# Patient Record
(demographics unavailable — no encounter records)

---

## 2025-05-27 NOTE — PHYSICAL EXAM
[Alert] : alert [Normocephalic] : normocephalic [Flat Open Anterior [place]] : flat open anterior fontanelle [PERRL] : PERRL [Red Reflex Bilateral] : red reflex bilateral [Normally Placed Ears] : normally placed ears [Auricles Well Formed] : auricles well formed [Clear Tympanic membranes] : clear tympanic membranes [Light reflex present] : light reflex present [Bony structures visible] : bony structures visible [Patent Auditory Canal] : patent auditory canal [Nares Patent] : nares patent [Palate Intact] : palate intact [Uvula Midline] : uvula midline [Supple, full passive range of motion] : supple, full passive range of motion [Symmetric Chest Rise] : symmetric chest rise [Clear to Auscultation Bilaterally] : clear to auscultation bilaterally [Regular Rate and Rhythm] : regular rate and rhythm [S1, S2 present] : S1, S2 present [+2 Femoral Pulses] : +2 femoral pulses [Soft] : soft [Bowel Sounds] : bowel sounds present [Umbilical Stump Dry, Clean, Intact] : umbilical stump dry, clean, intact [Normal external genitalia] : normal external genitalia [Patent Vagina] : patent vagina [Patent] : patent [Normally Placed] : normally placed [No Abnormal Lymph Nodes Palpated] : no abnormal lymph nodes palpated [Symmetric Flexed Extremities] : symmetric flexed extremities [Startle Reflex] : startle reflex present [Suck Reflex] : suck reflex present [Rooting] : rooting reflex present [Palmar Grasp] : palmar grasp present [Plantar Grasp] : plantar reflex present [Symmetric [place]] : symmetric [employer]

## 2025-05-27 NOTE — PHYSICAL EXAM
[Alert] : alert [Normocephalic] : normocephalic [Flat Open Anterior [place]] : flat open anterior fontanelle [PERRL] : PERRL [Red Reflex Bilateral] : red reflex bilateral [Normally Placed Ears] : normally placed ears [Auricles Well Formed] : auricles well formed [Clear Tympanic membranes] : clear tympanic membranes [Light reflex present] : light reflex present [Bony structures visible] : bony structures visible [Patent Auditory Canal] : patent auditory canal [Nares Patent] : nares patent [Palate Intact] : palate intact [Uvula Midline] : uvula midline [Supple, full passive range of motion] : supple, full passive range of motion [Symmetric Chest Rise] : symmetric chest rise [Clear to Auscultation Bilaterally] : clear to auscultation bilaterally [Regular Rate and Rhythm] : regular rate and rhythm [S1, S2 present] : S1, S2 present [+2 Femoral Pulses] : +2 femoral pulses [Soft] : soft [Bowel Sounds] : bowel sounds present [Umbilical Stump Dry, Clean, Intact] : umbilical stump dry, clean, intact [Normal external genitalia] : normal external genitalia [Patent Vagina] : patent vagina [Patent] : patent [Normally Placed] : normally placed [No Abnormal Lymph Nodes Palpated] : no abnormal lymph nodes palpated [Symmetric Flexed Extremities] : symmetric flexed extremities [Startle Reflex] : startle reflex present [Suck Reflex] : suck reflex present [Rooting] : rooting reflex present [Palmar Grasp] : palmar grasp present [Plantar Grasp] : plantar reflex present [Symmetric [place]] : symmetric [place]

## 2025-05-27 NOTE — HISTORY OF PRESENT ILLNESS
[Expressed Breast milk ___oz/feed] : [unfilled] oz of expressed breast milk per feed [Hours between feeds ___] : Child is fed every [unfilled] hours [Normal] : Normal [Frequency of stools: ___] : Frequency of stools: [unfilled]  stools [per day] : per day. [Yellow] : yellow [Seedy] : seedy [Born at ___ Wks Gestation] : The patient was born at [unfilled] weeks gestation [] : via normal spontaneous vaginal delivery [[facility]] : at [facility] [(1) _____] : [unfilled] [(5) _____] : [unfilled] [Meconium] : meconium [BW: _____] : weight of [unfilled] [Length: _____] : length of [unfilled] [HC: _____] : head circumference of [unfilled] [DW: _____] : Discharge weight was [unfilled] [Time of Birth: _____] : Time of birth was [unfilled] [Age: ___] : [unfilled] year old mother [G: ___] : G [unfilled] [P: ___] : P [unfilled] [GBS] : GBS positive [Rubella (Immune)] : Rubella immune [None] : There are no risk factors [Antibiotics: ______] : antibiotics ([unfilled]) [TcB: _____] : Transcutaneous Bilirubin [unfilled] mg/dL [Yes] : Yes [In Bassinet/Crib] : sleeps in bassinet/crib [On back] : sleeps on back [No] : No cigarette smoke exposure [Water heater temperature set at <120 degrees F] : Water heater temperature set at <120 degrees F [Rear facing car seat in back seat] : Rear facing car seat in back seat [Carbon Monoxide Detectors] : Carbon monoxide detectors at home [Smoke Detectors] : Smoke detectors at home. [Hepatitis B Vaccine Given] : Hepatitis B vaccine given

## 2025-05-27 NOTE — HISTORY OF PRESENT ILLNESS
[Expressed Breast milk ___oz/feed] : [unfilled] oz of expressed breast milk per feed [Hours between feeds ___] : Child is fed every [unfilled] hours [Normal] : Normal [Frequency of stools: ___] : Frequency of stools: [unfilled]  stools [per day] : per day. [Yellow] : yellow [Seedy] : seedy [Born at ___ Wks Gestation] : The patient was born at [unfilled] weeks gestation [] : via normal spontaneous vaginal delivery [[facility]] : at [facility] [(1) _____] : [unfilled] [(5) _____] : [unfilled] [Meconium] : meconium [BW: _____] : weight of [unfilled] [Length: _____] : length of [unfilled] [HC: _____] : head circumference of [unfilled] [DW: _____] : Discharge weight was [unfilled] [Time of Birth: _____] : Time of birth was [unfilled] [Age: ___] : [unfilled] year old mother [G: ___] : G [unfilled] [P: ___] : P [unfilled] [GBS] : GBS positive [Rubella (Immune)] : Rubella immune [None] : There are no risk factors [Antibiotics: ______] : antibiotics ([unfilled]) [TcB: _____] : Transcutaneous Bilirubin [unfilled] mg/dL [Yes] : Yes [In Bassinet/Crib] : sleeps in bassinet/crib [On back] : sleeps on back [No] : No cigarette smoke exposure [Water heater temperature set at <120 degrees F] : Water heater temperature set at <120 degrees F [Rear facing car seat in back seat] : Rear facing car seat in back seat [Carbon Monoxide Detectors] : Carbon monoxide detectors at home [Smoke Detectors] : Smoke detectors at home. [Hepatitis B Vaccine Given] : Hepatitis B vaccine given

## 2025-05-29 NOTE — DISCUSSION/SUMMARY
[FreeTextEntry1] :  5 day old female for weight check with  jaundice appears clinically well with bili trending down. Recommend exclusive breastfeeding, 8-12 feedings per day. Mother should continue prenatal vitamins and avoid alcohol. Will supplement with 1-2oz of formula after nursing sessions as needed, recommend iron-fortified formula - infant to feed every 2-3 hrs.  To start vit D.  Signs of jaundice reviewed.  TCB - 19.2 from > 20 (serum from 18.8 to 17.2)  d/w parents techniques to improve jaundice - indirect sunlight, frequent feedings.  To monitor UO and BM.  Will repeat bili tomorrow am. D/w parents RED FLAGS to go back to hospital - increased sleepiness, decreased feedings, decrease UO. When in car, patient should be in rear-facing car seat in back seat.  Air dry umbilical stump.  Put baby to sleep on back, in own crib with no loose or soft bedding.  Limit baby's exposure to others, especially those with fever or unknown vaccine status. Masking, social distancing and hand hygiene reviewed. Weight/bili check tomorrow AM.  Well care at 1 month Return sooner PRN Parents without questions.

## 2025-05-29 NOTE — PHYSICAL EXAM
[NL] : warm, clear [Tired appearing] : not tired appearing [Lethargic] : not lethargic [Stridor] : no stridor [FreeTextEntry1] : Looking around [FreeTextEntry5] : Sclera icterus [de-identified] : fading jaundice to chest.

## 2025-05-29 NOTE — HISTORY OF PRESENT ILLNESS
[de-identified] : weight/bili check [FreeTextEntry6] : Presents for weight & bili check.  Gained 1.5oz in 1 day. Yesterday TCB > 20 yesterday went for serum was 18.8 last night went to ED -- serum trended down to 17.2 Breastfeeding on demand every 2-3 hrs. Gained 1.5oz since yesterday.  Good UO/wet diapers//BM - yellow/seedy/loose  - after almost every feeding.  Umbilical cord intact/dry.  No other concerns at this time.

## 2025-05-31 NOTE — HISTORY OF PRESENT ILLNESS
[de-identified] : weight and bili check [FreeTextEntry6] : Last weight: 6 lb 2oz Feeding: BF and doing byheart, 60-70 cc per feed, feeding q2-3 hrs, feeding w/ the bottle UOP: 7+ Stools: multiple yellow seedy daily Safe sleep endorsed Parental concerns: color is improving

## 2025-05-31 NOTE — DISCUSSION/SUMMARY
[FreeTextEntry1] :  8 do F here for weight and bili check. Gained 4 oz in 2 days, TCB down trending to 15.4. Baby well appearing and well hydrated. Continue indirect sunlight. Monitor UO.  Discussed indications to go to ED.  - Feeding: Recommend exclusive breastfeeding, 8-12 feedings per day. Start/ continue vit D for baby.  Mother should continue prenatal vitamins and avoid alcohol. If formula is needed, recommend iron-fortified formulations, 2-4 oz every 2-3 hrs.  - Safety: When in car, patient should be in rear-facing car seat in back seat. Put baby to sleep on back, in own crib with no loose or soft bedding.  - Help baby to develop sleep and feeding routines.  - Limit baby's exposure to others, especially those with fever or unknown vaccine status. Parents counseled to call if rectal temperature >100.4 degrees F.  RTC in 5 days for recheck.

## 2025-05-31 NOTE — PHYSICAL EXAM
[Normal External Genitalia] : normal external genitalia [Soft] : soft [Negative Ortalani/Santana] : negative Ortalani/Santana [NL] : normotonic [FreeTextEntry5] : + scleral icterus [FreeTextEntry9] : + cord c/d/i [de-identified] : + jaundice

## 2025-05-31 NOTE — DISCUSSION/SUMMARY
[FreeTextEntry1] :  6 do F here for weight and bili check. Gained 0.5 oz from yesterday, TCB down trending to 18.9. Baby well appearing and well hydrated. Continue indirect sunlight. Monitor UO.  Discussed indications to go to ED.  - Feeding: Recommend exclusive breastfeeding, 8-12 feedings per day. Start/ continue vit D for baby.  Mother should continue prenatal vitamins and avoid alcohol. If formula is needed, recommend iron-fortified formulations, 2-4 oz every 2-3 hrs.  - Safety: When in car, patient should be in rear-facing car seat in back seat. Put baby to sleep on back, in own crib with no loose or soft bedding.  - Help baby to develop sleep and feeding routines.  - Limit baby's exposure to others, especially those with fever or unknown vaccine status. Parents counseled to call if rectal temperature >100.4 degrees F.  RTC in 2 days for recheck.

## 2025-05-31 NOTE — HISTORY OF PRESENT ILLNESS
[de-identified] : weight and bili check [FreeTextEntry6] : Last weight: 6 lb 2oz Feeding: BF and doing byheart, 60-70 cc per feed, feeding q2-3 hrs, feeding w/ the bottle UOP: 7+ Stools: multiple yellow seedy daily Safe sleep endorsed Parental concerns: color is improving

## 2025-05-31 NOTE — HISTORY OF PRESENT ILLNESS
[de-identified] : bili [FreeTextEntry6] : Last weight: 6 lb 2.5 oz Feeding: EHM and byheart doing  per feed UOP: 7+ Stools: yellow seedy, multiple daily Safe sleep endorsed Parental concerns: intermittent spit ups, cord still attached

## 2025-05-31 NOTE — PHYSICAL EXAM
[Soft] : soft [Normal External Genitalia] : normal external genitalia [Negative Ortalani/Santana] : negative Ortalani/Santana [NL] : normotonic [FreeTextEntry5] : + scleral icterus (improving) [FreeTextEntry9] : + cord c/d/i [de-identified] : + jaundice (improving)

## 2025-05-31 NOTE — PHYSICAL EXAM
[Soft] : soft [Normal External Genitalia] : normal external genitalia [Negative Ortalani/Santana] : negative Ortalani/Santana [NL] : normotonic [FreeTextEntry5] : + scleral icterus [FreeTextEntry9] : + cord c/d/i [de-identified] : + jaundice

## 2025-06-03 NOTE — DISCUSSION/SUMMARY
[FreeTextEntry1] :  10 day old female for weight check, doing well, with h/o mild congestion at night with normal exam.  d/w parents may do gentle saline drops with suctioning 2-3x max a day.  Recommend exclusive breastfeeding, 8-12 feedings per day. Mother should continue prenatal vitamins and avoid alcohol. If formula is needed, recommend iron-fortified formulations every 2-3 hrs.  To continue vit D.  Signs of jaundice reviewed.  TCB 12.7 - trending down - will continue frequent feedings/indirect sunlight.  RED FLAGS reviewed.   When in car, patient should be in rear-facing car seat in back seat.  Air dry umbilical stump.  Put baby to sleep on back, in own crib with no loose or soft bedding.  Limit baby's exposure to others, especially those with fever or unknown vaccine status. Masking, social distancing and hand hygiene reviewed. Weight check as scheduled end of the week.  Well care at 1 month Return sooner PRN Parents without questions.

## 2025-06-03 NOTE — COUNSELING
[Use of Plain Language] : use of plain language [Teach Back Method] : teach back method [Adequate] : adequate [None] : none Show Asc Variables: Yes

## 2025-06-03 NOTE — PHYSICAL EXAM
[NL] : warm, clear [Lethargic] : not lethargic [Consolable] : consolable [Stridor] : no stridor [de-identified] : fading jaundice

## 2025-06-03 NOTE — PHYSICAL EXAM
[NL] : warm, clear [Lethargic] : not lethargic [Consolable] : consolable [Stridor] : no stridor [de-identified] : fading jaundice

## 2025-06-03 NOTE — HISTORY OF PRESENT ILLNESS
[de-identified] : weight/congestion  [FreeTextEntry6] :  Presents for weight check.  Gained 3 oz in 3 days.  Breastfeeding on demand every 2-3 hrs.  Parents note yesterday with congestion/fussy - no fever.   Skin coloring improving - TCB 12.7 Good UO/wet diapers//BM - yellow/seedy/loose after almost every feeding.  Umbilical cord intact.  No other concerns at this time.

## 2025-06-03 NOTE — HISTORY OF PRESENT ILLNESS
[de-identified] : weight/congestion  [FreeTextEntry6] :  Presents for weight check.  Gained 3 oz in 3 days.  Breastfeeding on demand every 2-3 hrs.  Parents note yesterday with congestion/fussy - no fever.   Skin coloring improving - TCB 12.7 Good UO/wet diapers//BM - yellow/seedy/loose after almost every feeding.  Umbilical cord intact.  No other concerns at this time.

## 2025-06-06 NOTE — HISTORY OF PRESENT ILLNESS
[FreeTextEntry6] : weight check and bili check feeding breast milk by breast and bottle about2 oz  feeds every 2-3 hr UO/BM are wdl umbilical stump fell off and there is some moisture noted at the site

## 2025-06-06 NOTE — PHYSICAL EXAM
[Soft] : soft [NL] : warm, clear [FreeTextEntry9] : umbilical hernia, umbilical cord granuloma [de-identified] : color is pink

## 2025-06-06 NOTE — DISCUSSION/SUMMARY
[FreeTextEntry1] : Bili is down trending color is pink  excellent weight gain umbilical granuloma cauterized Recommend exclusive breastfeeding, 8-12 feedings per day. Mother should continue prenatal vitamins and avoid alcohol. If formula is needed, recommend iron-fortified formulations every 2-3 hrs. When in car, patient should be in rear-facing car seat in back seat. Put baby to sleep on back, in own crib with no loose or soft bedding. Limit baby's exposure to others, especially those with fever or unknown vaccine status. Care for the area around the granuloma as directed.  Dont put your baby in bathwater until the granuloma has healed. Instead, bathe your baby with a sponge or damp washcloth.  seek medical attention if: Your child has a fever of 100.4F (38C) or higher, or as directed by the provider. (Seek treatment right away. Fever in a young baby can be a sign of a serious infection.) Your jessica granuloma does not heal within the timeframe given by the provider. Your child has signs of infection around the granuloma, such as increased redness, swelling, or cloudy or foul-smelling drainage.  There is bleeding from the granuloma. Your child cries or appears to be pain when you touch the area around the cord and navel. Your child develops a rash, pimples, or blisters around the navel. Your child appears ill or has any other symptoms that concern you.

## 2025-06-23 NOTE — HISTORY OF PRESENT ILLNESS
[Normal] : Normal [No] : No cigarette smoke exposure [Water heater temperature set at <120 degrees F] : Water heater temperature set at <120 degrees F [Rear facing car seat in back seat] : Rear facing car seat in back seat [Carbon Monoxide Detectors] : Carbon monoxide detectors at home [Smoke Detectors] : Smoke detectors at home. [Parents] : parents [Breast milk] : breast milk [Hours between feeds ___] : Child is fed every [unfilled] hours [Frequency of stools: ___] : Frequency of stools: [unfilled]  stools [per day] : per day. [Yellow] : yellow [Seedy] : seedy [Loose] : loose consistency [In Bassinet/Crib] : sleeps in bassinet/crib [On back] : sleeps on back [Co-sleeping] : no co-sleeping [Loose bedding, pillow, toys, and/or bumpers in crib] : no loose bedding, pillow, toys, and/or bumpers in crib [Exposure to electronic nicotine delivery system] : No exposure to electronic nicotine delivery system [At risk for exposure to TB] : Not at risk for exposure to Tuberculosis  [de-identified] : general questions  [de-identified] : Breastmilk/Byheart -  ml per feeding  [de-identified] : HepB due

## 2025-06-23 NOTE — DEVELOPMENTAL MILESTONES
[Normal Development] : Normal Development [None] : none [Calms when picked up or spoken to] : calms when picked up or spoken to [Looks briefly at objects] : looks briefly at objects [Alerts to unexpected sound] : alerts to unexpected sound [Makes brief short vowel sounds] : makes brief short vowel sounds [Holds chin up in prone] : holds chin up in prone [Holds fingers more open at rest] : holds fingers more open at rest [FreeTextEntry1] : Reviewed with mom - she states she has baby blues but not depressed will call office if concerns.  [FreeTextEntry2] : 11

## 2025-06-23 NOTE — DISCUSSION/SUMMARY
[Normal Growth] : growth [Normal Development] : development  [No Elimination Concerns] : elimination [Continue Regimen] : feeding [No Skin Concerns] : skin [Normal Sleep Pattern] : sleep [None] : no medical problems [Anticipatory Guidance Given] : Anticipatory guidance addressed as per the history of present illness section [Parental Well-Being] : parental well-being [Family Adjustment] : family adjustment [Feeding Routines] : feeding routines [Infant Adjustment] : infant adjustment [Safety] : safety [Age Approp Vaccines] : Age appropriate vaccines administered [] : The components of the vaccine(s) to be administered today are listed in the plan of care. The disease(s) for which the vaccine(s) are intended to prevent and the risks have been discussed with the caretaker.  The risks are also included in the appropriate vaccination information statements which have been provided to the patient's caregiver.  The caregiver has given consent to vaccinate. [FreeTextEntry1] :   1 month old female currently well, normal growth/development. Jaundice faded.  Recommend to continue combo breastfeeding, 8-12 feedings per day. Mother should continue prenatal vitamins and avoid alcohol. If formula is needed, recommend iron-fortified formulations, 2-4 oz every 2-3 hrs.  Vit D daily.  When in car, patient should be in rear-facing car seat in back seat. Put baby to sleep on back, in own crib with no loose or soft bedding. Help baby to develop sleep and feeding routines. May offer pacifier if needed. Start tummy time when awake. General safety/sun safety/outdoor safety reviewed. Limit baby's exposure to others, especially those with fever or unknown vaccine status. Parents counseled to call if rectal temperature >100.4 degrees F. Masking, social distancing and hand hygiene reviewed. d/w parents vaccines - HepB #2 due - risks/benefits/side effects reviewed- VIS given - parents agree to update without questions. Well care in 1 month. Return sooner PRN. Parents without questions.

## 2025-06-23 NOTE — PHYSICAL EXAM
[Alert] : alert [Normocephalic] : normocephalic [Flat Open Anterior [place]] : flat open anterior fontanelle [PERRL] : PERRL [Red Reflex Bilateral] : red reflex bilateral [Normally Placed Ears] : normally placed ears [Auricles Well Formed] : auricles well formed [Clear Tympanic membranes] : clear tympanic membranes [Light reflex present] : light reflex present [Bony landmarks visible] : bony landmarks visible [Nares Patent] : nares patent [Palate Intact] : palate intact [Uvula Midline] : uvula midline [Supple, full passive range of motion] : supple, full passive range of motion [Symmetric Chest Rise] : symmetric chest rise [Clear to Auscultation Bilaterally] : clear to auscultation bilaterally [Regular Rate and Rhythm] : regular rate and rhythm [S1, S2 present] : S1, S2 present [+2 Femoral Pulses] : +2 femoral pulses [Soft] : soft [Bowel Sounds] : bowel sounds present [Normal external genitailia] : normal external genitalia [Patent Vagina] : vagina patent [Normally Placed] : normally placed [No Abnormal Lymph Nodes Palpated] : no abnormal lymph nodes palpated [Symmetric Flexed Extremities] : symmetric flexed extremities [Startle Reflex] : startle reflex present [Suck Reflex] : suck reflex present [Rooting] : rooting reflex present [Palmar Grasp] : palmar grasp reflex present [Plantar Grasp] : plantar grasp reflex present [Symmetric [place]] : symmetric [place] [Consolable] : consolable [Acute Distress] : no acute distress [Discharge] : no discharge [Palpable Masses] : no palpable masses [Murmurs] : no murmurs [Tender] : nontender [Distended] : not distended [Hepatomegaly] : no hepatomegaly [Splenomegaly] : no splenomegaly [Clitoromegaly] : no clitoromegaly [Santana-Ortolani] : negative Santana-Ortolani [Spinal Dimple] : no spinal dimple [Tuft of Hair] : no tuft of hair [Jaundice] : no jaundice [Rash and/or lesion present] : no rash/lesion

## 2025-07-26 NOTE — DEVELOPMENTAL MILESTONES
[Normal Development] : Normal Development [None] : none [Smiles responsively] : smiles responsively [Vocalizes with simple cooing] : vocalizes with simple cooing [Lifts head and chest in prone] : lifts head and chest in prone [Opens and shuts hands] : opens and shuts hands [Passed] : passed [FreeTextEntry2] : 9

## 2025-07-26 NOTE — DISCUSSION/SUMMARY
[Normal Growth] : growth [Normal Development] : development  [No Elimination Concerns] : elimination [Continue Regimen] : feeding [No Skin Concerns] : skin [Normal Sleep Pattern] : sleep [None] : no medical problems [Anticipatory Guidance Given] : Anticipatory guidance addressed as per the history of present illness section [Parental (Maternal) Well-Being] : parental (maternal) well-being [Infant-Family Synchrony] : infant-family synchrony [Nutritional Adequacy] : nutritional adequacy [Infant Behavior] : infant behavior [Safety] : safety [Age Approp Vaccines] : Age appropriate vaccines administered [No Medications] : ~He/She~ is not on any medications [Mother] : mother [Father] : father [Parental Concerns Addressed] : Parental concerns addressed [] : The components of the vaccine(s) to be administered today are listed in the plan of care. The disease(s) for which the vaccine(s) are intended to prevent and the risks have been discussed with the caretaker.  The risks are also included in the appropriate vaccination information statements which have been provided to the patient's caregiver.  The caregiver has given consent to vaccinate. [FreeTextEntry1] :   2 month old female currently well with good weight gain. Recommend breastfeeding, 8-12 feedings per day. Mother should continue prenatal vitamins and avoid alcohol. If formula is needed, recommend iron-fortified formulations, 2-4 oz every 3-4 hrs.  To continue vitD infant drops as directed. When in car, patient should be in rear-facing car seat in back seat. Put baby to sleep on back, in own crib with no loose or soft bedding. Help baby to maintain sleep and feeding routines. May offer pacifier if needed. Continue tummy time when awake. Parents counseled to call if rectal temperature >100.4 degrees F. Masking, social distancing and hand hygiene reviewed. d/w parents the following vaccines - Dtap/IPV/HIB, PCV & ROTA - risks/benefits/side effects reviewed - parents agree to vaccination today without questions.  VIS given. Return in 2 months for well care Return sooner PRN Parents without questions at this time.

## 2025-07-26 NOTE — HISTORY OF PRESENT ILLNESS
[Parents] : parents [Breast milk] : breast milk [Hours between feeds ___] : Child is fed every [unfilled] hours [Normal] : Normal [In Bassinet/Crib] : sleeps in bassinet/crib [On back] : sleeps on back [No] : No cigarette smoke exposure [Water heater temperature set at <120 degrees F] : Water heater temperature set at <120 degrees F [Rear facing car seat in back seat] : Rear facing car seat in back seat [Carbon Monoxide Detectors] : Carbon monoxide detectors at home [Smoke Detectors] : Smoke detectors at home. [Co-sleeping] : no co-sleeping [Loose bedding, pillow, toys, and/or bumpers in crib] : no loose bedding, pillow, toys, and/or bumpers in crib [Exposure to electronic nicotine delivery system] : No exposure to electronic nicotine delivery system [At risk for exposure to TB] : Not at risk for exposure to Tuberculosis  [de-identified] : none  [de-identified] : Byhart - 100120ml  [de-identified] : due for 2 month vaccines.

## 2025-07-26 NOTE — DEVELOPMENTAL MILESTONES
[Normal Development] : Normal Development [None] : none [Smiles responsively] : smiles responsively [Vocalizes with simple cooing] : vocalizes with simple cooing [Lifts head and chest in prone] : lifts head and chest in prone [Opens and shuts hands] : opens and shuts hands Quality 226: Preventive Care And Screening: Tobacco Use: Screening And Cessation Intervention: Patient screened for tobacco use and is an ex/non-smoker [Passed] : passed Detail Level: Detailed [FreeTextEntry2] : 9 Quality 431: Preventive Care And Screening: Unhealthy Alcohol Use - Screening: Patient not identified as an unhealthy alcohol user when screened for unhealthy alcohol use using a systematic screening method

## 2025-07-26 NOTE — PHYSICAL EXAM
[Alert] : alert [Normocephalic] : normocephalic [Flat Open Anterior [place]] : flat open anterior fontanelle [PERRL] : PERRL [Red Reflex Bilateral] : red reflex bilateral [Normally Placed Ears] : normally placed ears [Auricles Well Formed] : auricles well formed [Clear Tympanic membranes] : clear tympanic membranes [Light reflex present] : light reflex present [Bony landmarks visible] : bony landmarks visible [Nares Patent] : nares patent [Palate Intact] : palate intact [Uvula Midline] : uvula midline [Supple, full passive range of motion] : supple, full passive range of motion [Symmetric Chest Rise] : symmetric chest rise [Clear to Auscultation Bilaterally] : clear to auscultation bilaterally [Regular Rate and Rhythm] : regular rate and rhythm [S1, S2 present] : S1, S2 present [+2 Femoral Pulses] : +2 femoral pulses [Soft] : soft [Bowel Sounds] : bowel sounds present [Normal external genitailia] : normal external genitalia [Patent Vagina] : vagina patent [Normally Placed] : normally placed [No Abnormal Lymph Nodes Palpated] : no abnormal lymph nodes palpated [Symmetric Flexed Extremities] : symmetric flexed extremities [Startle Reflex] : startle reflex present [Suck Reflex] : suck reflex present [Rooting] : rooting reflex present [Palmar Grasp] : palmar grasp reflex present [Plantar Grasp] : plantar grasp reflex present [Symmetric [place]] : symmetric [place] [Acute Distress] : no acute distress [Consolable] : consolable [Discharge] : no discharge [Palpable Masses] : no palpable masses [Murmurs] : no murmurs [Tender] : nontender [Distended] : not distended [Hepatomegaly] : no hepatomegaly [Splenomegaly] : no splenomegaly [Clitoromegaly] : no clitoromegaly [Santana-Ortolani] : negative Santana-Ortolani [Spinal Dimple] : no spinal dimple [Tuft of Hair] : no tuft of hair [Rash and/or lesion present] : no rash/lesion [Dermal Melanocytosis] : Dermal Melanocytosis

## 2025-07-26 NOTE — HISTORY OF PRESENT ILLNESS
[Parents] : parents [Breast milk] : breast milk [Hours between feeds ___] : Child is fed every [unfilled] hours [Normal] : Normal [In Bassinet/Crib] : sleeps in bassinet/crib [On back] : sleeps on back [No] : No cigarette smoke exposure [Water heater temperature set at <120 degrees F] : Water heater temperature set at <120 degrees F [Rear facing car seat in back seat] : Rear facing car seat in back seat [Carbon Monoxide Detectors] : Carbon monoxide detectors at home [Smoke Detectors] : Smoke detectors at home. [Co-sleeping] : no co-sleeping [Loose bedding, pillow, toys, and/or bumpers in crib] : no loose bedding, pillow, toys, and/or bumpers in crib [Exposure to electronic nicotine delivery system] : No exposure to electronic nicotine delivery system [At risk for exposure to TB] : Not at risk for exposure to Tuberculosis  [de-identified] : none  [de-identified] : Byhart - 100120ml  [de-identified] : due for 2 month vaccines.

## 2025-07-26 NOTE — PHYSICAL EXAM
[Alert] : alert [Normocephalic] : normocephalic [Flat Open Anterior [place]] : flat open anterior fontanelle [PERRL] : PERRL [Red Reflex Bilateral] : red reflex bilateral [Normally Placed Ears] : normally placed ears [Auricles Well Formed] : auricles well formed [Clear Tympanic membranes] : clear tympanic membranes [Light reflex present] : light reflex present [Bony landmarks visible] : bony landmarks visible [Nares Patent] : nares patent [Palate Intact] : palate intact [Uvula Midline] : uvula midline [Supple, full passive range of motion] : supple, full passive range of motion [Symmetric Chest Rise] : symmetric chest rise [Clear to Auscultation Bilaterally] : clear to auscultation bilaterally [Regular Rate and Rhythm] : regular rate and rhythm [S1, S2 present] : S1, S2 present [+2 Femoral Pulses] : +2 femoral pulses [Soft] : soft [Bowel Sounds] : bowel sounds present [Normal external genitailia] : normal external genitalia [Patent Vagina] : vagina patent [Normally Placed] : normally placed [No Abnormal Lymph Nodes Palpated] : no abnormal lymph nodes palpated [Symmetric Flexed Extremities] : symmetric flexed extremities [Startle Reflex] : startle reflex present [Suck Reflex] : suck reflex present [Rooting] : rooting reflex present [Palmar Grasp] : palmar grasp reflex present [Plantar Grasp] : plantar grasp reflex present [Symmetric [place]] : symmetric [place] [Acute Distress] : no acute distress [Consolable] : consolable [Discharge] : no discharge [Palpable Masses] : no palpable masses [Murmurs] : no murmurs [Tender] : nontender [Distended] : not distended [Hepatomegaly] : no hepatomegaly [Splenomegaly] : no splenomegaly [Clitoromegaly] : no clitoromegaly [Sant[place]-Ortolani] : negative Santana-Ortolani [Spinal Dimple] : no spinal dimple [Tuft of Hair] : no tuft of hair [Rash and/or lesion present] : no rash/lesion [Dermal Melanocytosis] : Dermal Melanocytosis